# Patient Record
Sex: FEMALE | URBAN - METROPOLITAN AREA
[De-identification: names, ages, dates, MRNs, and addresses within clinical notes are randomized per-mention and may not be internally consistent; named-entity substitution may affect disease eponyms.]

---

## 2018-06-22 ENCOUNTER — APPOINTMENT (RX ONLY)
Dept: URBAN - METROPOLITAN AREA CLINIC 17 | Facility: CLINIC | Age: 63
Setting detail: DERMATOLOGY
End: 2018-06-22

## 2018-06-22 DIAGNOSIS — Z41.9 ENCOUNTER FOR PROCEDURE FOR PURPOSES OTHER THAN REMEDYING HEALTH STATE, UNSPECIFIED: ICD-10-CM

## 2018-06-22 PROCEDURE — ? RESTYLANE DEFYNE INJECTION

## 2018-06-22 PROCEDURE — ? COSMETIC CONSULTATION: FILLERS

## 2018-06-22 PROCEDURE — ? COSMETIC CONSULTATION: BOTULINUM TOXIN

## 2018-06-22 PROCEDURE — ? BOTOX

## 2018-06-22 NOTE — PROCEDURE: BOTOX
Periorbital Skin Units: 0
Additional Area 1 Location: forehead, glabella
Dilution (U/0.1 Cc): 1
Consent: Written consent obtained. Risks include but not limited to lid/brow ptosis, bruising, swelling, diplopia temporary effect, incomplete chemical denervation
Expiration Date (Month Year): 08/2020
Lot #: [id number]
Detail Level: Zone
Glabellar Complex Units: 20

## 2018-06-22 NOTE — PROCEDURE: RESTYLANE DEFYNE INJECTION
Include Cannula Information In Note?: No
Brows Filler Volume In Cc: 0
Procedural Text: The filler was administered to the treatment areas noted above.
Marionette Lines Filler Volume In Cc: 1
Expiration Date (Month Year): 02/28/2020
Filler: Restylane Defyne
Lot #: [id number]
Post-Care Instructions: Patient instructed to apply ice to reduce swelling.
Map Statement: See Attach Map for Complete Details
Consent: Written consent obtained. Risks include but not limited to bruising, beading, irregular texture, ulceration, infection, allergic reaction, scar formation, incomplete augmentation, temporary nature, procedural pain.
Detail Level: Detailed
Anesthesia Type: 1% lidocaine with epinephrine